# Patient Record
Sex: FEMALE | URBAN - METROPOLITAN AREA
[De-identification: names, ages, dates, MRNs, and addresses within clinical notes are randomized per-mention and may not be internally consistent; named-entity substitution may affect disease eponyms.]

---

## 2018-01-29 NOTE — PATIENT DISCUSSION
(F39.880) Keratoconjunct sicca, not specified as Sjogren's, bilateral - Assesment : Examination revealed Dry Eye Syndrome - Plan : Monitor for changes. Patient to use Artificial Tears 3-4 times daily.

## 2018-01-29 NOTE — PATIENT DISCUSSION
(H52.13) Myopia, bilateral - Assesment : Refractive testing reveals myopia (nearsightedness) mild. H/O [address] OU. H/O contact lens wear. Pt reads without glasses and very happy about this. Patient does not like wearing glasses full-time, recommend trailing contact lenses possibly mono va. Advised patient theres always a trade off if sharpen up her distance with surgery then her really good near vision will need to be corrected with reading glasses all the time. - Plan : Monitor for changes.  Final Rx for glasses given to patient Rtc for Contact lens fit with Cynthia(mono va) 1 year Exam

## 2019-08-28 NOTE — PATIENT DISCUSSION
(J47.592) Keratoconjunct sicca, not specified as Sjogren's, bilateral - Assesment : Examination revealed Dry Eye Syndrome OU. Patient is a daily CL wearer. - Plan : Monitor for changes. Advised patient to call our office with decreased vision or increased symptoms.  RV 1 year Cl/Exam.

## 2019-08-28 NOTE — PATIENT DISCUSSION
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 Small RPE change OD. (-) Fhx of AMD. - Plan : Strongly recommend UV protection when outside. Monitor for changes. Advised patient to call our office with decreased vision or increased symptoms.

## 2019-08-28 NOTE — PATIENT DISCUSSION
(H52.13) Myopia, bilateral - Assesment : Refractive testing reveals myopia (nearsightedness) mild. H/O [address] OU. Patient wears CL on a daily basis. Recommend increasing power of CL in her dominant eye and continue slightly more nearsighted OS for a monovision. Recommend CL OD Ultra -1.25 and continue CL -0.75 OS. Pt reads without glasses and very happy about this. Advised patient theres always a trade off if we sharpen up her distance with Laser vision correction then her really good near vision will need to be corrected with reading glasses all the time. - Plan : Monitor for changes.  Final Rx for glasses given to patient

## 2020-07-23 NOTE — PATIENT DISCUSSION
Recommended artificial tears or rewetting drops to use as directed. Warning: This plan will only bill for destructions on the Penis and Vulva. If you select the Scrotum it will not bill. Render Post-Care Instructions In Note?: no Anesthesia Volume In Cc: 0.5 Detail Level: Detailed Consent: The patient's consent was obtained including but not limited to risks of crusting, scabbing, blistering, scarring, darker or lighter pigmentary change, recurrence, incomplete removal and infection. Post-Care Instructions: I reviewed with the patient in detail post-care instructions. Patient is to wear sunprotection, and avoid picking at any of the treated lesions. Pt may apply Vaseline to crusted or scabbing areas. Method: electrodesiccation and curettage

## 2020-10-29 ENCOUNTER — IMPORTED ENCOUNTER (OUTPATIENT)
Dept: URBAN - METROPOLITAN AREA CLINIC 9 | Facility: CLINIC | Age: 75
End: 2020-10-29

## 2021-08-17 NOTE — PATIENT DISCUSSION
Patient advised to not sleep in contact lens. Refit to Biofinity for better comfort. Trials given. or consider refit to oasys 2 weekly, if dryness worserns.

## 2021-08-17 NOTE — PATIENT DISCUSSION
Discussed near vision options: glasses over CL, monovision, and multifocal contact lenses with patient. Prefers to keep as distance only.

## 2021-10-18 ASSESSMENT — VISUAL ACUITY
OD_SC: 20/25 -2 SN
OD_CC: 20/20 - SN
OS_SC: 20/25 - SN
OS_CC: 20/20 - SN

## 2021-10-18 ASSESSMENT — TONOMETRY
OS_IOP_MMHG: 14
OD_IOP_MMHG: 19

## 2022-06-22 NOTE — PATIENT DISCUSSION
Patient advised to not sleep in contact lens. Biofinity for better comfort. Discussed and demonstrated in office OD cl only/mono to improve near TIME PLUS Q. Pt declines at this time. Prefers sharper dist. Continue  rx.